# Patient Record
Sex: FEMALE | Race: WHITE | URBAN - METROPOLITAN AREA
[De-identification: names, ages, dates, MRNs, and addresses within clinical notes are randomized per-mention and may not be internally consistent; named-entity substitution may affect disease eponyms.]

---

## 2017-02-14 ENCOUNTER — TELEPHONE (OUTPATIENT)
Dept: FAMILY MEDICINE CLINIC | Facility: CLINIC | Age: 82
End: 2017-02-14

## 2017-02-14 NOTE — TELEPHONE ENCOUNTER
Reviewed doctor's recommendations with pt, pt state she will call back to schedule appt on Thursday if no improvement in symptoms. Pt had no further questions at this time.

## 2017-02-14 NOTE — TELEPHONE ENCOUNTER
Reason for Call/Chief Complaint: cough, lost her voice  Onset: 1 week ago  Nursing Assessment/Associated Symptoms: Throat hurts a little bit. No fever. Cough is occasionally productive. Pt is blowing nose, not sure if anything is going down back of throat.

## 2017-02-14 NOTE — TELEPHONE ENCOUNTER
Agree with advice given.  Please have her schedule an appt with Dr. Noah Madrid tomorrow or on Thursday if no improvement

## 2017-02-14 NOTE — TELEPHONE ENCOUNTER
Pt stated has had sore throat, OTC and home remedies not working, declined appt for now, please advise.

## 2017-02-16 ENCOUNTER — OFFICE VISIT (OUTPATIENT)
Dept: FAMILY MEDICINE CLINIC | Facility: CLINIC | Age: 82
End: 2017-02-16

## 2017-02-16 VITALS
DIASTOLIC BLOOD PRESSURE: 74 MMHG | OXYGEN SATURATION: 97 % | WEIGHT: 142 LBS | HEART RATE: 67 BPM | RESPIRATION RATE: 18 BRPM | BODY MASS INDEX: 25 KG/M2 | SYSTOLIC BLOOD PRESSURE: 158 MMHG | TEMPERATURE: 97 F

## 2017-02-16 DIAGNOSIS — I10 ESSENTIAL HYPERTENSION: ICD-10-CM

## 2017-02-16 DIAGNOSIS — R05.9 COUGH: Primary | ICD-10-CM

## 2017-02-16 PROCEDURE — 99213 OFFICE O/P EST LOW 20 MIN: CPT | Performed by: FAMILY MEDICINE

## 2017-02-16 PROCEDURE — G0463 HOSPITAL OUTPT CLINIC VISIT: HCPCS | Performed by: FAMILY MEDICINE

## 2017-02-16 RX ORDER — LOVASTATIN 20 MG/1
10 TABLET ORAL
COMMUNITY
Start: 2016-12-13

## 2017-02-16 NOTE — H&P
HPI: Akhil Leary is a 80year old female who presents for sore throat and cough x 10 days. Started with sore throat and progressed to cough. No associated sinus congestion or post nasal drainage. No headache or sinus pressure. No ear pain or drainage.    Cough is drainage, hearing loss and nasal drainage.  See HPI  Pulm: See HPI  Eyes:  Negative for eye discharge and vision loss  Gastrointestinal:  Negative for abdominal pain, constipation, decreased appetite, diarrhea and vomiting  Genitourinary:  Negative for dysu history with the patient and performed an independent exam.  I agree with the above note and plan.   Jolene Shah, DO

## 2017-02-21 PROBLEM — I10 HTN (HYPERTENSION): Status: ACTIVE | Noted: 2017-02-21

## 2022-06-04 ENCOUNTER — TELEPHONE ENCOUNTER (OUTPATIENT)
Dept: URBAN - METROPOLITAN AREA CLINIC 68 | Facility: CLINIC | Age: 87
End: 2022-06-04

## 2022-06-05 ENCOUNTER — TELEPHONE ENCOUNTER (OUTPATIENT)
Dept: URBAN - METROPOLITAN AREA CLINIC 68 | Facility: CLINIC | Age: 87
End: 2022-06-05

## 2022-06-25 ENCOUNTER — TELEPHONE ENCOUNTER (OUTPATIENT)
Age: 87
End: 2022-06-25

## 2022-06-26 ENCOUNTER — TELEPHONE ENCOUNTER (OUTPATIENT)
Age: 87
End: 2022-06-26

## (undated) NOTE — MR AVS SNAPSHOT
[facility]  [address], [address]  [phone]               Thank you for choosing us for your health care visit with Mendez Lerma MD.  We are glad to serve you and happy to provide you with this summary [email or web address]